# Patient Record
Sex: MALE | Race: BLACK OR AFRICAN AMERICAN | NOT HISPANIC OR LATINO | Employment: OTHER | URBAN - METROPOLITAN AREA
[De-identification: names, ages, dates, MRNs, and addresses within clinical notes are randomized per-mention and may not be internally consistent; named-entity substitution may affect disease eponyms.]

---

## 2024-08-26 ENCOUNTER — OFFICE VISIT (OUTPATIENT)
Dept: ORTHOPEDIC SURGERY | Facility: CLINIC | Age: 49
End: 2024-08-26
Payer: COMMERCIAL

## 2024-08-26 ENCOUNTER — HOSPITAL ENCOUNTER (OUTPATIENT)
Dept: RADIOLOGY | Facility: CLINIC | Age: 49
Discharge: HOME | End: 2024-08-26
Payer: COMMERCIAL

## 2024-08-26 VITALS — BODY MASS INDEX: 38.5 KG/M2 | HEIGHT: 74 IN | WEIGHT: 300 LBS

## 2024-08-26 DIAGNOSIS — M54.16 LUMBAR RADICULOPATHY: ICD-10-CM

## 2024-08-26 DIAGNOSIS — M54.16 LUMBAR RADICULOPATHY: Primary | ICD-10-CM

## 2024-08-26 PROCEDURE — 72110 X-RAY EXAM L-2 SPINE 4/>VWS: CPT | Performed by: RADIOLOGY

## 2024-08-26 PROCEDURE — 99213 OFFICE O/P EST LOW 20 MIN: CPT

## 2024-08-26 PROCEDURE — 72110 X-RAY EXAM L-2 SPINE 4/>VWS: CPT

## 2024-08-26 PROCEDURE — 72120 X-RAY BEND ONLY L-S SPINE: CPT

## 2024-08-26 PROCEDURE — 3008F BODY MASS INDEX DOCD: CPT

## 2024-08-26 PROCEDURE — 99203 OFFICE O/P NEW LOW 30 MIN: CPT

## 2024-08-26 ASSESSMENT — PAIN DESCRIPTION - DESCRIPTORS: DESCRIPTORS: DISCOMFORT;PATIENT UNABLE TO DESCRIBE

## 2024-08-26 ASSESSMENT — PAIN SCALES - GENERAL: PAINLEVEL_OUTOF10: 4

## 2024-08-26 ASSESSMENT — PAIN - FUNCTIONAL ASSESSMENT: PAIN_FUNCTIONAL_ASSESSMENT: 0-10

## 2024-08-27 NOTE — PROGRESS NOTES
HPI:  Kamari Becker is a 49-year-old male who presents today with a year plus history of low back pain and leg pain that is slowly becoming more constant.  He states the pain is in the back of the thighs and is equal on both sides.  The pain comes and goes.  He takes diclofenac which helps.  He cannot pinpoint what makes things worse.  He has not tried physical therapy or epidural injections.    ROS:  Reviewed on EMR and patient intake sheet.    PMH/SH:  Reviewed on EMR and patient intake sheet.    Exam:  MSK: Full strength range of motion of lower extremities bilaterally.  Negative straight leg raise bilaterally.  General: No acute distress. Awake and conversant.  Eyes: Normal conjunctiva, anicteric. Round symmetric pupils.  ENT: Hearing grossly intact. No nasal discharge.  Neck: Neck is supple. No masses or thyromegaly.  Respiratory: Respirations are non-labored. No wheezing.  Skin: Warm. No rashes or ulcers.  Psych: Alert and oriented. Cooperative, appropriate mood and affect, normal judgement.  CV: No lower extremity edema.  Neuro: Sensation and CN II-XII grossly normal.    Radiology:     X-rays personally reviewed and demonstrate degenerative disc at L4/L5.  No acute fractures or dislocations.  No spondylolisthesis.  Slight levocurvature of lumbar spine.    Diagnosis:    Lumbar radiculopathy  Degenerative disc disease, lumbar  Lumbar spondylosis    Assessment and Plan:  Patient was seen today and evaluated for lumbar radicular symptoms that are slowly becoming more consistent.  At this time, we discussed conservative management of walking, core strengthening, over-the-counter pain medications, and weight management.  He was referred to physical therapy today.  He was also referred to pain management for epidural injections.  He should follow-up after completion of physical therapy, and at that point, we may consider an MRI.  Patient feels all questions were answered today.  Patient agrees to plan  above.    Miguel Heller PA-C  Department of Orthopaedic Surgery  9:12 AM  08/27/24    [id number] [place] [address]    Voicemail: [phone]   Appts: [phone]  Fax: [phone]

## 2024-09-11 ENCOUNTER — OFFICE VISIT (OUTPATIENT)
Dept: PAIN MEDICINE | Facility: HOSPITAL | Age: 49
End: 2024-09-11
Payer: COMMERCIAL

## 2024-09-11 DIAGNOSIS — M54.16 LUMBAR RADICULOPATHY: ICD-10-CM

## 2024-09-11 DIAGNOSIS — M51.36 LUMBAR DEGENERATIVE DISC DISEASE: Primary | ICD-10-CM

## 2024-09-11 PROCEDURE — 99213 OFFICE O/P EST LOW 20 MIN: CPT | Performed by: PAIN MEDICINE

## 2024-09-11 PROCEDURE — 99203 OFFICE O/P NEW LOW 30 MIN: CPT | Performed by: PAIN MEDICINE

## 2024-09-11 PROCEDURE — 1036F TOBACCO NON-USER: CPT | Performed by: PAIN MEDICINE

## 2024-09-11 RX ORDER — ALBUTEROL SULFATE 90 UG/1
INHALANT RESPIRATORY (INHALATION)
COMMUNITY

## 2024-09-11 RX ORDER — BUDESONIDE AND FORMOTEROL FUMARATE DIHYDRATE 160; 4.5 UG/1; UG/1
AEROSOL RESPIRATORY (INHALATION)
COMMUNITY

## 2024-09-11 RX ORDER — TRIAMCINOLONE ACETONIDE 1 MG/G
OINTMENT TOPICAL EVERY 12 HOURS
COMMUNITY
Start: 2022-11-22

## 2024-09-11 RX ORDER — NAPROXEN 500 MG/1
TABLET ORAL EVERY 12 HOURS
COMMUNITY
Start: 2022-11-22

## 2024-09-11 RX ORDER — KETOTIFEN FUMARATE 0.35 MG/ML
SOLUTION/ DROPS OPHTHALMIC
COMMUNITY

## 2024-09-11 RX ORDER — ASPIRIN 325 MG
TABLET, DELAYED RELEASE (ENTERIC COATED) ORAL
COMMUNITY
Start: 2022-11-23

## 2024-09-11 ASSESSMENT — PAIN - FUNCTIONAL ASSESSMENT: PAIN_FUNCTIONAL_ASSESSMENT: 0-10

## 2024-09-11 ASSESSMENT — PAIN SCALES - GENERAL: PAINLEVEL_OUTOF10: 8

## 2024-09-11 ASSESSMENT — PAIN DESCRIPTION - DESCRIPTORS: DESCRIPTORS: NUMBNESS;TINGLING;SHOOTING

## 2024-09-11 NOTE — PROGRESS NOTES
Subjective   Patient ID: Kamari Becker is a 49 y.o. male who presents with lower back pain and bilateral leg pain.     HPI:     This is a 49 y.o. male who presents with lower back pain and bilateral leg pain. He describes lower back pain across the middle that shoots down the buttocks and posterior thighs bilaterally. He reports that the pain is constant throughout the day. It is worse in the morning and improves with movement. He reports that diclofenac has helped with the pain. He does not participate in formal physical therapy but does stretching exercises which seems to help. He has not tried injections in the past. He has not gotten an MRI but a lumbar X-ray from 11/2022 showed Advanced lumbar degenerative change L4-5.    Physical Therapy: The patient has not done physical therapy within the past six months  Classes of medications tried in the past: NSAIDs    Review of Systems   13-point ROS done and negative except for HPI.     Current Outpatient Medications   Medication Instructions    albuterol (Ventolin HFA) 90 mcg/actuation inhaler inhalation    budesonide-formoteroL (Symbicort) 160-4.5 mcg/actuation inhaler inhalation    cholecalciferol (Vitamin D-3) 50,000 unit capsule oral    ketotifen (Zaditor) 0.025 % (0.035 %) ophthalmic solution ophthalmic (eye)    naproxen (Naprosyn) 500 mg tablet oral, Every 12 hours    triamcinolone (Kenalog) 0.1 % ointment Every 12 hours       Past Medical History:   Diagnosis Date    Personal history of other diseases of the respiratory system     History of asthma        No past surgical history on file.     No family history on file.     Allergies   Allergen Reactions    Penicillins Itching and Rash        Objective     There were no vitals filed for this visit.     Physical Exam  General: NAD, well groomed, well nourished  Eyes: Non-icteric sclera, EOMI  Ears, Nose, Mouth, and Throat: External ears and nose appear to be without deformity or rash. No lesions or masses noted.  Hearing is grossly intact.   Neck: Trachea midline  Respiratory: Nonlabored breathing   Cardiovascular: trace peripheral edema   Skin: No rashes or open lesions/ulcers identified on skin.    Back:   Palpation: Tenderness to palpation over lumbar spinous processes/paraspinous muscles.   Straight leg raise: does not reproduce their pain, bilaterally   FREDERICK Maneuver does not reproduce pain bilaterally    Neurologic:   Cranial nerves grossly intact.   Strength 5/5 and symmetric plantar/dorsiflexion   Sensation: Normal to light touch and pinprick throughout.    Psychiatric: Alert, orientation to person, place, and time. Cooperative.    Imaging personally reviewed and independently interpreted.    He has not gotten an MRI but a lumbar X-ray from 11/2022 showed Advanced lumbar degenerative change L4-5.    Assessment/Plan     This is a 49 y.o. male who presents with lower back pain and bilateral shooting posterior thigh pain, suggestive of lumbar radiculopathy.    Plan:    -Encouraged the patient to schedule PT as he already has a prescription  -Educated patient to return to our office if his symptoms persist/worsen after PT after which we can get a lumbar MRI    Follow up: As needed     The patient was invited to contact us back anytime with any questions or concerns and follow-up with us in the office as needed.     Case discussed with Dr. Timo Thapa, PGY1